# Patient Record
Sex: MALE | Race: ASIAN | NOT HISPANIC OR LATINO | Employment: FULL TIME
[De-identification: names, ages, dates, MRNs, and addresses within clinical notes are randomized per-mention and may not be internally consistent; named-entity substitution may affect disease eponyms.]

---

## 2020-03-10 ENCOUNTER — HEALTH MAINTENANCE LETTER (OUTPATIENT)
Age: 40
End: 2020-03-10

## 2020-12-27 ENCOUNTER — HEALTH MAINTENANCE LETTER (OUTPATIENT)
Age: 40
End: 2020-12-27

## 2021-04-24 ENCOUNTER — HEALTH MAINTENANCE LETTER (OUTPATIENT)
Age: 41
End: 2021-04-24

## 2021-10-04 ENCOUNTER — HEALTH MAINTENANCE LETTER (OUTPATIENT)
Age: 41
End: 2021-10-04

## 2022-05-15 ENCOUNTER — HEALTH MAINTENANCE LETTER (OUTPATIENT)
Age: 42
End: 2022-05-15

## 2022-09-11 ENCOUNTER — HEALTH MAINTENANCE LETTER (OUTPATIENT)
Age: 42
End: 2022-09-11

## 2023-06-03 ENCOUNTER — HEALTH MAINTENANCE LETTER (OUTPATIENT)
Age: 43
End: 2023-06-03

## 2023-10-11 ENCOUNTER — HOSPITAL ENCOUNTER (EMERGENCY)
Facility: CLINIC | Age: 43
Discharge: HOME | End: 2023-10-11
Attending: EMERGENCY MEDICINE | Admitting: EMERGENCY MEDICINE
Payer: COMMERCIAL

## 2023-10-11 ENCOUNTER — APPOINTMENT (OUTPATIENT)
Dept: GENERAL RADIOLOGY | Facility: CLINIC | Age: 43
End: 2023-10-11
Attending: EMERGENCY MEDICINE
Payer: COMMERCIAL

## 2023-10-11 VITALS
HEIGHT: 69 IN | DIASTOLIC BLOOD PRESSURE: 84 MMHG | HEART RATE: 73 BPM | TEMPERATURE: 97.5 F | RESPIRATION RATE: 20 BRPM | BODY MASS INDEX: 28.31 KG/M2 | SYSTOLIC BLOOD PRESSURE: 118 MMHG | OXYGEN SATURATION: 99 % | WEIGHT: 191.14 LBS

## 2023-10-11 DIAGNOSIS — M25.512 ACUTE PAIN OF LEFT SHOULDER: ICD-10-CM

## 2023-10-11 PROCEDURE — 99284 EMERGENCY DEPT VISIT MOD MDM: CPT | Mod: 25

## 2023-10-11 PROCEDURE — 71046 X-RAY EXAM CHEST 2 VIEWS: CPT

## 2023-10-11 PROCEDURE — 93005 ELECTROCARDIOGRAM TRACING: CPT

## 2023-10-11 ASSESSMENT — ACTIVITIES OF DAILY LIVING (ADL): ADLS_ACUITY_SCORE: 35

## 2023-10-11 NOTE — ED TRIAGE NOTES
"Pt c/o pain left shoulder, left upper arm, left chest wall, radiates to left upper back. Pt states the pain started Friday. Denies injury. Pain is worse with movement. Pt has not taken anything for pain, states \"I hate medicine\"     Triage Assessment (Adult)       Row Name 10/11/23 0849          Triage Assessment    Airway WDL WDL        Respiratory WDL    Respiratory WDL WDL        Skin Circulation/Temperature WDL    Skin Circulation/Temperature WDL WDL        Cardiac WDL    Cardiac WDL chest pain        Chest Pain Assessment    Chest Pain Location shoulder, left;anterior chest, left     Chest Pain Radiation back     Precipitating Factors activity     Alleviating Factors rest                     " Surgeon/Pathologist Verbiage (Will Incorporate Name Of Surgeon From Intro If Not Blank): operated in two distinct and integrated capacities as the surgeon and pathologist.

## 2023-10-11 NOTE — ED PROVIDER NOTES
"  History     Chief Complaint:  Chest Pain       HPI   Brett Kapoor is a 43 year old male presents emergency department with a complaint of left trapezius muscle pain.  Patient reports this started 6 days ago.  He denies any trauma, lifting or moving.  He states that it is worse when he moves his arm around.  He has not taken any Tylenol or Motrin because he does not like taking medicines.  He has not had any chest pain or shortness of breath.      Independent Historian:   None - Patient Only    Review of External Notes:   Reviewed the patient's previous office visit with his family medicine doctor, he has prediabetes, hyperlipidemia that is not requiring statin.      Medications:    IBUPROFEN PO  indomethacin (INDOCIN) 25 MG capsule        Past Medical History:    No past medical history on file.    Past Surgical History:    No past surgical history on file.     Physical Exam   Patient Vitals for the past 24 hrs:   BP Temp Temp src Pulse Resp SpO2 Height Weight   10/11/23 0848 118/84 97.5  F (36.4  C) Temporal 73 20 99 % 1.753 m (5' 9\") 86.7 kg (191 lb 2.2 oz)        Physical Exam  General: Well-nourished, resting comfortably when I enter the room  Eyes: Pupils equal, conjunctivae pink no scleral icterus or conjunctival injection  ENT:  Moist mucus membranes  Respiratory:  Lungs clear to auscultation bilaterally, no crackles/rubs/wheezes.  Good air movement  CV: Normal rate and rhythm, no murmurs  GI:  Abdomen soft and non-distended.  No tenderness, guarding or rebound  Skin: Warm, dry.  No rashes or petechiae  Musculoskeletal: Left shoulder has full range of motion, it is painful with the range of motion in his trapezius muscle and in his paravertebral musculature of his thoracic upper back.  No tenderness to palpation of the musculature.  No midline tenderness of the spine.  No swelling, pulses intact, brisk capillary refill.  No deformities palpated.  Neuro: Alert and oriented to " person/place/time  Psychiatric: Normal affect      Emergency Department Course     ECG results from 10/11/23   EKG 12-lead, tracing only     Value    Systolic Blood Pressure     Diastolic Blood Pressure     Ventricular Rate 60    Atrial Rate 60    DC Interval 146    QRS Duration 92        QTc 414    P Axis 65    R AXIS 29    T Axis 17    Interpretation ECG      Sinus rhythm  Normal ECG  No previous ECGs available           Imaging:  Chest XR,  PA & LAT   Final Result             Laboratory:  Labs Ordered and Resulted from Time of ED Arrival to Time of ED Departure - No data to display     Procedures       Emergency Department Course & Assessments:             Interventions:  Medications - No data to display     Assessments:  Patient is refusing any ibuprofen or Tylenol.    Independent Interpretation (X-rays, CTs, rhythm strip):  Chest x-ray does not show any signs of consolidation, pneumothorax, pleural effusion.    Consultations/Discussion of Management or Tests:  None        Social Determinants of Health affecting care:   None    Disposition:  The patient was discharged to home.     Impression & Plan    CMS Diagnoses: None      Medical Decision Makin-year-old male presents emergency department with a complaint of left shoulder pain.  Patient reports that it is in his trapezius muscle and in his upper back muscles on the left.  No radiation.  No swelling.  He has not had any trauma to his shoulder.  Patient denies any chest pain, shortness of breath.  Differential includes but is not limited to MI, PE, DVT, musculoskeletal.  Patient's EKG shows normal sinus rhythm, no signs of ischemia.  I have low suspicion for an acute coronary event.  He is also not tachycardic, hypotensive, hypoxic, short of breath.  He is PERC negative and low risk for PE.  He does not have any swelling in his arm or neck.  I do not think that he has a DVT.  He does not have a rash, and I do not think that he has shingles.  His  pain does worsen with range of motion of his shoulder.  I think this is musculoskeletal.  Patient refuses any ibuprofen or Tylenol.  I did advise him he could he use heat or ice for comfort.  He should do gentle stretching.  He is given return precautions.  Is discharged home.      Diagnosis:    ICD-10-CM    1. Acute pain of left shoulder  M25.512            Discharge Medications:  New Prescriptions    No medications on file          Lyubov Alonzo MD  10/11/2023   No att. providers found        Lyubov Alonzo MD  10/11/23 1132

## 2023-10-11 NOTE — ED NOTES
PIT/Triage Evaluation    Patient presented with left shoulder pain.  Patient reports that he has had pain for 6 days.  It is constant in timing.  It is worse when he moves around.  Patient states that his pain is in his left trapezius and comes around to his clavicle.  He denies any trauma.  Denies any heavy lifting or moving.  He has not had any Tylenol or Motrin.  No fevers, shortness of breath.  No previous heart history.    Exam is notable for:  General: No distress  Abdomen: Soft, non-tender  Respiratory: No tachypnea  Cardiovascular: Equal pulses, brisk cap refill  Extremities: Moving all extremities.  No tenderness to palpation of the trapezius.      Appropriate interventions for symptom management were initiated if applicable.  Appropriate diagnostic tests were initiated if indicated.    Important information for subsequent clinician:  Left trapezius pain.  Worse with movement.  Happening for the past 6 days.  EKG and chest x-ray.    I briefly evaluated the patient and developed an initial plan of care. I discussed this plan and explained that this brief interaction does not constitute a full evaluation. Patient/family understands that they should wait to be fully evaluated and discuss any test results with another clinician prior to leaving the hospital.       Lyubov Alonzo MD  10/11/23 [id number]

## 2023-10-11 NOTE — DISCHARGE INSTRUCTIONS
Please return to the emergency department if your symptoms increase, you experience an increase in pain, swelling, or start having chest pain or shortness of breath.    Please use ice and heat for comfort.  Please do gentle stretching.  No heavy lifting.  You can use Tylenol and ibuprofen for pain.    Please follow-up with your primary care provider in the next week.

## 2023-10-12 LAB
ATRIAL RATE - MUSE: 60 BPM
DIASTOLIC BLOOD PRESSURE - MUSE: NORMAL MMHG
INTERPRETATION ECG - MUSE: NORMAL
P AXIS - MUSE: 65 DEGREES
PR INTERVAL - MUSE: 146 MS
QRS DURATION - MUSE: 92 MS
QT - MUSE: 414 MS
QTC - MUSE: 414 MS
R AXIS - MUSE: 29 DEGREES
SYSTOLIC BLOOD PRESSURE - MUSE: NORMAL MMHG
T AXIS - MUSE: 17 DEGREES
VENTRICULAR RATE- MUSE: 60 BPM

## 2024-11-24 ENCOUNTER — HEALTH MAINTENANCE LETTER (OUTPATIENT)
Age: 44
End: 2024-11-24